# Patient Record
Sex: MALE | Race: WHITE | NOT HISPANIC OR LATINO | URBAN - METROPOLITAN AREA
[De-identification: names, ages, dates, MRNs, and addresses within clinical notes are randomized per-mention and may not be internally consistent; named-entity substitution may affect disease eponyms.]

---

## 2020-10-28 ENCOUNTER — EMERGENCY (EMERGENCY)
Facility: HOSPITAL | Age: 65
LOS: 1 days | End: 2020-10-28
Payer: MEDICARE

## 2020-10-28 PROCEDURE — 93010 ELECTROCARDIOGRAM REPORT: CPT

## 2020-10-28 PROCEDURE — 99285 EMERGENCY DEPT VISIT HI MDM: CPT | Mod: CS

## 2020-10-28 PROCEDURE — 70450 CT HEAD/BRAIN W/O DYE: CPT | Mod: 26,59

## 2020-10-28 PROCEDURE — 0042T: CPT

## 2020-10-28 PROCEDURE — 71045 X-RAY EXAM CHEST 1 VIEW: CPT | Mod: 26

## 2020-10-28 PROCEDURE — 70498 CT ANGIOGRAPHY NECK: CPT | Mod: 26

## 2020-10-28 PROCEDURE — 70496 CT ANGIOGRAPHY HEAD: CPT | Mod: 26

## 2021-02-22 ENCOUNTER — RESULT REVIEW (OUTPATIENT)
Age: 66
End: 2021-02-22

## 2022-02-22 ENCOUNTER — NON-APPOINTMENT (OUTPATIENT)
Age: 67
End: 2022-02-22

## 2022-03-02 ENCOUNTER — APPOINTMENT (OUTPATIENT)
Dept: OPHTHALMOLOGY | Facility: CLINIC | Age: 67
End: 2022-03-02
Payer: MEDICARE

## 2022-03-02 ENCOUNTER — NON-APPOINTMENT (OUTPATIENT)
Age: 67
End: 2022-03-02

## 2022-03-02 PROCEDURE — 92004 COMPRE OPH EXAM NEW PT 1/>: CPT

## 2022-03-16 ENCOUNTER — TRANSCRIPTION ENCOUNTER (OUTPATIENT)
Age: 67
End: 2022-03-16

## 2022-04-06 ENCOUNTER — NON-APPOINTMENT (OUTPATIENT)
Age: 67
End: 2022-04-06

## 2022-04-06 ENCOUNTER — APPOINTMENT (OUTPATIENT)
Dept: OPHTHALMOLOGY | Facility: CLINIC | Age: 67
End: 2022-04-06
Payer: MEDICARE

## 2022-04-06 PROCEDURE — 99213 OFFICE O/P EST LOW 20 MIN: CPT

## 2022-04-14 ENCOUNTER — APPOINTMENT (OUTPATIENT)
Dept: ORTHOPEDIC SURGERY | Facility: CLINIC | Age: 67
End: 2022-04-14
Payer: MEDICARE

## 2022-04-14 VITALS — HEIGHT: 73 IN | BODY MASS INDEX: 27.57 KG/M2 | WEIGHT: 208 LBS

## 2022-04-14 DIAGNOSIS — M25.561 PAIN IN RIGHT KNEE: ICD-10-CM

## 2022-04-14 DIAGNOSIS — M25.562 PAIN IN RIGHT KNEE: ICD-10-CM

## 2022-04-14 PROBLEM — Z00.00 ENCOUNTER FOR PREVENTIVE HEALTH EXAMINATION: Status: ACTIVE | Noted: 2022-04-14

## 2022-04-14 PROCEDURE — S0020: CPT

## 2022-04-14 PROCEDURE — 73562 X-RAY EXAM OF KNEE 3: CPT | Mod: LT

## 2022-04-14 PROCEDURE — 99214 OFFICE O/P EST MOD 30 MIN: CPT | Mod: 25

## 2022-04-14 PROCEDURE — 73564 X-RAY EXAM KNEE 4 OR MORE: CPT | Mod: RT

## 2022-04-14 PROCEDURE — 20610 DRAIN/INJ JOINT/BURSA W/O US: CPT

## 2022-04-14 NOTE — HISTORY OF PRESENT ILLNESS
[Gradual] : gradual [2] : 2 [Localized] : localized [Constant] : constant [Sitting] : sitting [Standing] : standing [Full time] : Work status: full time [de-identified] : 67M p/w geraldine knee pain. He has a history of a R UKA 10 15 years ago and was doing well until 10 months ago when he started treatment for prostate ca. He has not tried any PT or NSAIDs since they started to bug him. [] : no [FreeTextEntry1] : b/l knee [FreeTextEntry5] : pain started about 9-10 months ago [FreeTextEntry9] : standing [de-identified] : orthopedic, 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

## 2022-04-14 NOTE — PROCEDURE
[Left] : of the left [Knee] : knee [Pain] : pain [Inflammation] : inflammation [X-ray evidence of Osteoarthritis on this or prior visit] : x-ray evidence of Osteoarthritis on this or prior visit [Alcohol] : alcohol [Betadine] : betadine [Ethyl Chloride sprayed topically] : ethyl chloride sprayed topically [Sterile technique used] : sterile technique used [___ cc    0.25%] : Bupivacaine (Marcaine) ~Vcc of 0.25%  [___ cc    40mg] : Triamcinolone (Kenalog) ~Vcc of 40 mg  [Call if redness, pain or fever occur] : call if redness, pain or fever occur [Apply ice for 15min out of every hour for the next 12-24 hours as tolerated] : apply ice for 15 minutes out of every hour for the next 12-24 hours as tolerated [Previous OTC use and PT nontherapeutic] : patient has tried OTC's including aspirin, Ibuprofen, Aleve, etc or prescription NSAIDS, and/or exercises at home and/or physical therapy without satisfactory response [Patient had decreased mobility in the joint] : patient had decreased mobility in the joint [Risks, benefits, alternatives discussed / Verbal consent obtained] : the risks benefits, and alternatives have been discussed, and verbal consent was obtained

## 2022-04-14 NOTE — ASSESSMENT
[FreeTextEntry1] : 67M p/w adv L knee OA and R knee with degeneration of lateral and PF compartments s.p UKA\par \par He would like to continue with conservative mgmt for now, he is unable to take NSAIDs due to a previous GI bleed. CSI L knee tolerated well\par begin PT\par \par return 2-3mo\par \par The natural progression of Osteoarthritis was explained to the patient. We discussed the possible treatment options from conservative to operative. These included NSAIDS, Glucosamine and Chondrotin sulfate, and Physical Therapy as well different types of injections. We also discussed that at some point they may progress to needed a TKA. Information and pamphlets were given.\par

## 2022-04-14 NOTE — PHYSICAL EXAM
[NL (140)] : flexion 140 degrees [NL (0)] : extension 0 degrees [5___] : hamstring 5[unfilled]/5 [Right] : right knee [Left] : left knee [All Views] : anteroposterior, lateral, skyline, and anteroposterior standing [advanced tricompartmental OA with medial compartment narrowing and varus alignment] : advanced tricompartmental OA with medial compartment narrowing and varus alignment [] : non-antalgic [FreeTextEntry9] : adv lateral compartment OA changes, medial YKA

## 2022-07-14 ENCOUNTER — APPOINTMENT (OUTPATIENT)
Dept: ORTHOPEDIC SURGERY | Facility: CLINIC | Age: 67
End: 2022-07-14

## 2022-07-19 ENCOUNTER — APPOINTMENT (OUTPATIENT)
Dept: ORTHOPEDIC SURGERY | Facility: CLINIC | Age: 67
End: 2022-07-19

## 2022-07-19 PROCEDURE — 73030 X-RAY EXAM OF SHOULDER: CPT | Mod: LT

## 2022-07-19 PROCEDURE — 20610 DRAIN/INJ JOINT/BURSA W/O US: CPT

## 2022-07-19 PROCEDURE — 99214 OFFICE O/P EST MOD 30 MIN: CPT | Mod: 25

## 2022-07-20 NOTE — DISCUSSION/SUMMARY
[de-identified] : The patient has left shoulder recurrent rotator cuff tear. He is s/p right shoulder reverse total shoulder replacement with biceps tenodesis on 2/22/2021. The patient has a left shoulder exacerbation of an RCT. \par \par The patient was amenable to a left shoulder SAInjection. \par He tolerated it well. \par He will follow up as needed.

## 2022-07-20 NOTE — REASON FOR VISIT
[FreeTextEntry2] : The patient has left shoulder recurrent rotator cuff tear. He is s/p right shoulder reverse total shoulder replacement with biceps tenodesis on 2/22/2021.

## 2022-07-20 NOTE — HISTORY OF PRESENT ILLNESS
[de-identified] : The patient is here for left shoulder pain. He reports an increase in pain over the past 5-6 days following  his niece repeatedly. The patient reports increased pain and weakness to the anterolateral shoulder. He reports fatigue of the shoulder with overuse. He denies paresthesias or numbness. He denies new trauma. The patient is on chronic pain medications such as a fentanyl patch and oxycodone secondary to metastatic prostate CA. The patient wishes to try a steroid injection. He has previously tried PT without relief.

## 2022-07-20 NOTE — PHYSICAL EXAM
[Normal Coordination] : normal coordination [Normal Sensation] : normal sensation [Normal Mood and Affect] : normal mood and affect [Orientated] : orientated [Able to Communicate] : able to communicate [Normal Skin] : normal skin [Well Developed] : well developed [Well Nourished] : well nourished [Peripheral vascular exam is grossly normal] : peripheral vascular exam is grossly normal [Standing] : standing [Moderate] : moderate [Mild] : mild [3 ___] : forward flexion 3[unfilled]/5 [3___] : external rotation 3[unfilled]/5 [5___] : internal rotation 5[unfilled]/5 [Left] : left shoulder [] : no pain with internal rotation [FreeTextEntry1] : Left shoulder: No GHOA. No moderate AC OA. HRHH. No fractures or loose bodies. GH joint is reduced. Type II acromion.  [TWNoteComboBox7] : active forward flexion 145 degrees [de-identified] : active abduction 90 degrees [TWNoteComboBox6] : internal rotation L4 [de-identified] : external rotation 55 degrees

## 2022-07-20 NOTE — PROCEDURE
[Left] : of the left [Subacromial Space] : subacromial space [Pain] : pain [Inflammation] : inflammation [Betadine] : betadine [___ cc    2%] : Lidocaine ~Vcc of 2%  [___ cc    80mg] : Methylprednisolone (Depomedrol) ~Vcc of 80 mg  [] : Patient tolerated procedure well [Call if redness, pain or fever occur] : call if redness, pain or fever occur [Apply ice for 15min out of every hour for the next 12-24 hours as tolerated] : apply ice for 15 minutes out of every hour for the next 12-24 hours as tolerated [Previous OTC use and PT nontherapeutic] : patient has tried OTC's including aspirin, Ibuprofen, Aleve, etc or prescription NSAIDS, and/or exercises at home and/or physical therapy without satisfactory response [Patient had decreased mobility in the joint] : patient had decreased mobility in the joint [Risks, benefits, alternatives discussed / Verbal consent obtained] : the risks benefits, and alternatives have been discussed, and verbal consent was obtained

## 2023-06-07 ENCOUNTER — APPOINTMENT (OUTPATIENT)
Dept: ORTHOPEDIC SURGERY | Facility: CLINIC | Age: 68
End: 2023-06-07
Payer: MEDICARE

## 2023-06-07 DIAGNOSIS — M75.102 UNSPECIFIED ROTATOR CUFF TEAR OR RUPTURE OF LEFT SHOULDER, NOT SPECIFIED AS TRAUMATIC: ICD-10-CM

## 2023-06-07 PROCEDURE — 99214 OFFICE O/P EST MOD 30 MIN: CPT | Mod: 25

## 2023-06-07 PROCEDURE — 20611 DRAIN/INJ JOINT/BURSA W/US: CPT | Mod: LT

## 2023-06-07 NOTE — DISCUSSION/SUMMARY
[de-identified] : The patient has left shoulder recurrent rotator cuff tear. He is s/p right shoulder reverse total shoulder replacement with biceps tenodesis on 2/22/2021. The patient has a left shoulder exacerbation of an RCT. \par \par Pain with certain motions \par Subacromial Injection for diagnostic and therapeutic purposes \par If cortisone injection doesn't help consider MRI\par \par \par \par (1) We discussed a comprehensive treatment plans that included a prescription management plan involving the use of prescription strength medications to include Ibuprofen 600-800 mg TID, versus 500-650 mg Tylenol. We also discussed prescribing topical diclofenac (Voltaren gel) as well as once daily Meloxicam 15 mg.\par (2) The patient has More Than One chronic injuries/illnesses as outlined, discussed, and documented by ICD 10 codes listed, as well as the HPI and Plan section.\par There is a moderate risk of morbidity with further treatment, especially from use of prescription strength medications and possible side effects of these medications which include upset stomach and cardiac/renal issues with long term use were discussed.\par (3) I recommended that the patient follow-up with their medical physician to discuss any significant specific potential issues with long term use such as interactions with current medications or with exacerbation of underlying medical morbidities. \par \par Attestation:\par I, Maris MCCOY'Jessie , attest that this documentation has been prepared under the direction and in the presence of Provider Marlon Rhoades MD.\par The documentation recorded by the scribe, in my presence, accurately reflects the service I personally performed, and the decisions made by me with my edits as appropriate.\par Marlon Rhoades MD\par \par

## 2023-06-07 NOTE — PHYSICAL EXAM
[Normal Coordination] : normal coordination [Normal Sensation] : normal sensation [Normal Mood and Affect] : normal mood and affect [Orientated] : orientated [Able to Communicate] : able to communicate [Normal Skin] : normal skin [Well Developed] : well developed [Well Nourished] : well nourished [Peripheral vascular exam is grossly normal] : peripheral vascular exam is grossly normal

## 2023-06-07 NOTE — HISTORY OF PRESENT ILLNESS
[de-identified] : The patient is here for left shoulder pain. He reports an increase in pain over the past 5-6 days following  his niece repeatedly. The patient reports increased pain and weakness to the anterolateral shoulder. He reports fatigue of the shoulder with overuse. He denies paresthesias or numbness. He denies new trauma. The patient is on chronic pain medications such as a fentanyl patch and oxycodone secondary to metastatic prostate CA. The patient wishes to try a steroid injection. He has previously tried PT without relief. \par 6/7/23: Pt here for left shoulder pain. Pt saw Dr. Parish previously. Pt has stage 4 pancreatic cancer

## 2024-02-21 ENCOUNTER — APPOINTMENT (OUTPATIENT)
Dept: ORTHOPEDIC SURGERY | Facility: CLINIC | Age: 69
End: 2024-02-21
Payer: MEDICARE

## 2024-02-21 DIAGNOSIS — M54.2 CERVICALGIA: ICD-10-CM

## 2024-02-21 PROCEDURE — 72040 X-RAY EXAM NECK SPINE 2-3 VW: CPT

## 2024-02-21 PROCEDURE — 99214 OFFICE O/P EST MOD 30 MIN: CPT | Mod: 25,57

## 2024-02-21 PROCEDURE — 73030 X-RAY EXAM OF SHOULDER: CPT | Mod: RT

## 2024-02-21 PROCEDURE — 20610 DRAIN/INJ JOINT/BURSA W/O US: CPT | Mod: LT

## 2024-02-21 NOTE — PHYSICAL EXAM
[Normal Coordination] : normal coordination [Normal Sensation] : normal sensation [Normal Mood and Affect] : normal mood and affect [Oriented] : oriented [Able to Communicate] : able to communicate [Normal Skin] : normal skin [Well Developed] : well developed [Well Nourished] : well nourished [Peripheral vascular exam is grossly normal] : peripheral vascular exam is grossly normal [Disc space narrowing] : Disc space narrowing [Right] : right shoulder [Components well fixed, in good position] : Components well fixed, in good position

## 2024-02-28 ENCOUNTER — APPOINTMENT (OUTPATIENT)
Dept: ORTHOPEDIC SURGERY | Facility: CLINIC | Age: 69
End: 2024-02-28
Payer: MEDICARE

## 2024-02-28 VITALS — HEIGHT: 73 IN | BODY MASS INDEX: 23.86 KG/M2 | WEIGHT: 180 LBS

## 2024-02-28 DIAGNOSIS — Z85.46 PERSONAL HISTORY OF MALIGNANT NEOPLASM OF PROSTATE: ICD-10-CM

## 2024-02-28 DIAGNOSIS — M47.812 SPONDYLOSIS W/OUT MYELOPATHY OR RADICULOPATHY, CERVICAL REGION: ICD-10-CM

## 2024-02-28 DIAGNOSIS — Z96.611 PRESENCE OF RIGHT ARTIFICIAL SHOULDER JOINT: ICD-10-CM

## 2024-02-28 DIAGNOSIS — C80.1 MALIGNANT (PRIMARY) NEOPLASM, UNSPECIFIED: ICD-10-CM

## 2024-02-28 DIAGNOSIS — M25.512 PAIN IN LEFT SHOULDER: ICD-10-CM

## 2024-02-28 PROCEDURE — 99213 OFFICE O/P EST LOW 20 MIN: CPT

## 2024-03-01 PROBLEM — M25.512 LEFT SHOULDER PAIN, UNSPECIFIED CHRONICITY: Status: ACTIVE | Noted: 2022-07-19

## 2024-03-01 PROBLEM — M47.812 CERVICAL SPONDYLOSIS: Status: ACTIVE | Noted: 2024-03-01

## 2024-03-01 PROBLEM — Z96.611 PRESENCE OF RIGHT ARTIFICIAL SHOULDER JOINT: Status: ACTIVE | Noted: 2024-02-21

## 2024-03-01 NOTE — DATA REVIEWED
[FreeTextEntry1] : On my interpretation of these images from [facility] on 2/22/24.  I have additionally reviewed the radiologist report. CERVICAL MRI images were reviewed on today's visit. T2 sequences bony structures.  Lesion in C2, large lesion of C3 taking up most of vertebral body, posterior C4 body lesion, lesions most likely C5/6 and C6/7 throughout vertebral body. C2, C3 no HNP or stenosis. C3/4- mild broad herniation mild stenosis on rt.  C4/5- mild central stenosis small central herniation.  C5/6- mod-adv DDD with mild to mod left fs no central stenosis.  C6/7- mod-adv DDD with mod left fs C7/T1- adv DDD no significant stenosis.  T1/T2- normal

## 2024-03-01 NOTE — HISTORY OF PRESENT ILLNESS
[Neck] : neck [Tightness] : tightness [Constant] : constant [Sleep] : sleep [Walking] : walking [Nothing helps with pain getting better] : Nothing helps with pain getting better [de-identified] : 02/28/2024: Patient presenting today for an initial evaluation. Prior RT shoulder replacement with Dr. Parish, during physical therapy fx'd bone in arm unsure if from PT or metastatic prostate cancer. Seen by Dr. Rhoades, XRs taken, referred to Dr. Harris. He reports pain in rt arm and upwards, does not radiate to entire RUE. He reports weakness and pain in RT arm. Hx of torn RTC on left, 3 sxs in past.  Patient has stage 4 prostate cancer, diagnosed in July 2021. Radiating, hormone now on chemotherapy.  Shortness of breath had full cardio work up, al normal. No neck pain. No pain, numbness, tingling or weakness in the lower extremities b/l.     [] : no [FreeTextEntry1] : Left torn rotator cuf, rt has replacement [FreeTextEntry8] : Unable to get up from a seating position without assistance [de-identified] : Xray @ [facility] [de-identified] : none

## 2024-03-01 NOTE — DISCUSSION/SUMMARY
[de-identified] : 69 Y M with stage 4 metastatic prostate cancer. As he does not present any signs of symptoms of neck pain or radicular pains in his UE, cervical MRI findings are mainly left sided, while his symptoms are RT sided. Significant pain with shoulder ROM, cannot actively abduct rt shoulder. I do not think the cervical spine is the pain generator, follow up with Dr. Rhoades for further treatment of care.  Patient was educated and informed on their condition along with the expected outcomes. F/U PRN.    Prior to appointment and during encounter with patient extensive medical records were reviewed including but not limited to, hospital records, out patient records, imaging results, and lab data. During this appointment the patient was examined, diagnoses were discussed and explained in a face to face manner. In addition extensive time was spent reviewing aforementioned diagnostic studies. Counseling including abnormal image results, differential diagnoses, treatment options, risk and benefits, lifestyle changes, current condition, and current medications was performed. Patient's comments, questions, and concerns were address and patient verbalized understanding. Based on this patient's presentation at our office, which is an orthopedic spine surgeon's office, this patient inherently / intrinsically has a risk, however minute, of developing issues such as Cauda equina syndrome, bowel and bladder changes, or progression of motor or neurological deficits such as paralysis which may be permanent.   I, Judy Castle, attest that this documentation has been prepared under the direction and in the presence of provider Lele Harris MD.

## 2024-03-01 NOTE — PHYSICAL EXAM
[4___] : left deltoid  4[unfilled]/5 [de-identified] : Constitutional: - General Appearance: Unremarkable Body Habitus Well Developed Well Nourished Body Habitus No Deformities Well Groomed Ability To communicate: Normal Neurologic: Global sensation is intact to upper and lower extremities. See examination of Neck and/or Spine for exceptions. Orientation to Time, Place and Person is: Normal Mood And Affect is Normal Skin: - Head/Face, Right Upper/Lower Extremity, Left Upper/Lower Extremity: Normal See Examination of Neck and/or Spine for exceptions Cardiovascular: Peripheral Cardiovascular System is Normal Palpation of Lymph Nodes: Normal Palpation of lymph nodes in: Axilla, Cervical, Inguinal Abnormal Palpation of lymph nodes in: None  [] : negative Demarco reflex [de-identified] : he cannot actively abduction of arm b/c of known RTC. RUE- shoulder, delt 4/5, likely secondary to shoulder pain, rest of RUE is at least 4-4+/5.